# Patient Record
Sex: FEMALE | Race: WHITE | NOT HISPANIC OR LATINO | URBAN - METROPOLITAN AREA
[De-identification: names, ages, dates, MRNs, and addresses within clinical notes are randomized per-mention and may not be internally consistent; named-entity substitution may affect disease eponyms.]

---

## 2017-11-02 ENCOUNTER — TRANSFERRED RECORDS (OUTPATIENT)
Dept: HEALTH INFORMATION MANAGEMENT | Facility: CLINIC | Age: 22
End: 2017-11-02

## 2017-11-03 ENCOUNTER — MEDICAL CORRESPONDENCE (OUTPATIENT)
Dept: HEALTH INFORMATION MANAGEMENT | Facility: CLINIC | Age: 22
End: 2017-11-03

## 2017-12-13 ENCOUNTER — OFFICE VISIT (OUTPATIENT)
Dept: OPHTHALMOLOGY | Facility: CLINIC | Age: 22
End: 2017-12-13
Payer: COMMERCIAL

## 2017-12-13 DIAGNOSIS — H52.13 MYOPIA OF BOTH EYES: ICD-10-CM

## 2017-12-13 DIAGNOSIS — H53.10 SUBJECTIVE VISUAL DISTURBANCE OF BOTH EYES: ICD-10-CM

## 2017-12-13 DIAGNOSIS — S06.9X0S TRAUMATIC BRAIN INJURY WITHOUT LOSS OF CONSCIOUSNESS, SEQUELA (H): Primary | ICD-10-CM

## 2017-12-13 ASSESSMENT — EXTERNAL EXAM - LEFT EYE: OS_EXAM: NORMAL

## 2017-12-13 ASSESSMENT — VISUAL ACUITY
OS_CC: 20/20
OS_CC: J1+
CORRECTION_TYPE: CONTACTS
OD_CC+: -1
OD_CC: J1+
METHOD: SNELLEN - LINEAR
OD_CC: 20/20
OS_CC+: -1

## 2017-12-13 ASSESSMENT — REFRACTION_MANIFEST
OD_CYLINDER: SPHERE
OS_SPHERE: -6.25
OD_SPHERE: -5.75
OS_CYLINDER: SPHERE

## 2017-12-13 ASSESSMENT — TONOMETRY
OD_IOP_MMHG: 21
IOP_METHOD: APPLANATION
OS_IOP_MMHG: 21

## 2017-12-13 ASSESSMENT — EXTERNAL EXAM - RIGHT EYE: OD_EXAM: NORMAL

## 2017-12-13 ASSESSMENT — SLIT LAMP EXAM - LIDS
COMMENTS: NORMAL
COMMENTS: NORMAL

## 2017-12-13 ASSESSMENT — CONF VISUAL FIELD
OD_NORMAL: 1
OS_NORMAL: 1

## 2017-12-13 ASSESSMENT — CUP TO DISC RATIO
OD_RATIO: 0.4
OS_RATIO: 0.4

## 2017-12-13 NOTE — PROGRESS NOTES
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 Given drastic improvement in symptoms with time, it is questionable how much benefit patient would receive from occupational therapy. Suspect that patient's symptoms should continue to gradually improve. Recommended tinted lenses to reduce glare from stripes or other high contrast patterns. Patient should RTC with any worsening of symptoms.     (H53.10) Subjective visual disturbance of both eyes  Comment: After-images since concussion  Plan: See #1. Light grey or transitions tint may help with patient's symptoms. If patient does not report any improvement, she should RTC for additional tint eval.     (H52.13) Myopia of both eyes  Plan: Refraction        Discussed findings with patient. Slight change in Rx compared with habitual lenses may help with clarity of patient's distance vision.       Complete documentation of historical and exam elements from today's encounter can  be found in the full encounter summary report (not reduplicated in this progress  note). I personally obtained the chief complaint(s) and history of present illness. I  confirmed and edited as necessary the review of systems, past medical/surgical  history, family history, social history, and examination findings as documented by  others; and I examined the patient myself. I personally reviewed the relevant tests,  images, and reports as documented above. I formulated and edited as necessary the  assessment and plan and discussed the findings and management plan with the  patient and family.    Gaston Garcia OD

## 2017-12-13 NOTE — MR AVS SNAPSHOT
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      Who to contact     Please call your clinic at [phone] to:    Ask questions about your health    Make or cancel appointments    Discuss your medicines    Learn about your test results    Speak to your doctor   If you have compliments or concerns about an experience at your clinic, or if you wish to file a complaint, please contact [facility] Patient Relations at [phone] or email us at [email or web address]         Additional Information About Your Visit        MyChart Information     eReceiptst is an electronic gateway that provides easy, online access to your medical records. With RABT, you can request a clinic appointment, read your test results, renew a prescription or communicate with your care team.     To sign up for eReceiptst visit the website at [email or web address]   You will be asked to enter the access code listed below, as well as some personal information. Please follow the directions to create your username and password.     Your access code is: [email or web address]  Expires: 2018  6:30 AM     Your access code will  in 90 days. If you need help or a new code, please contact your [facility] or call [phone] for assistance.        Care EveryWhere ID     This is your Care EveryWhere ID. This could be used by other organizations to access your  [facility] medical records  [id number]         Blood Pressure from Last 3 Encounters:   No data found for BP    Weight from Last 3 Encounters:   No data found for Wt              We Performed the Following     Refraction        Primary Care Provider    None Specified       No primary provider on file.        Equal Access to Services     SOFÍA OG : Hadjacinto aad ku hadryan Meeks, warayneda luqadaha, qaybta kaalmada adebalta, leonard gonzalez ucheabdulaziz singh ira nam. So [facility] [place] [phone].    ATENCIÓN: Si habla español, tiene a smith disposición servicios gratuitos de asistencia lingüística. Llame al [phone].    We comply with applicable federal civil rights laws and [place] laws. We do not discriminate on the basis of race, color, national origin, age, disability, sex, sexual orientation, or gender identity.            Thank you!     Thank you for choosing [facility]  for your care. Our goal is always to provide you with excellent care. Hearing back from our patients is one way we can continue to improve our services. Please take a few minutes to complete the written survey that you may receive in the mail after your visit with us. Thank you!             Your Updated Medication List - Protect others around you: Learn how to safely use, store and throw away your medicines at www.disposemymeds.org.      Notice  As of 12/13/2017  4:19 PM    You have not been prescribed any medications.

## 2018-07-11 ENCOUNTER — TRANSFERRED RECORDS (OUTPATIENT)
Dept: HEALTH INFORMATION MANAGEMENT | Facility: CLINIC | Age: 23
End: 2018-07-11

## 2018-08-22 ENCOUNTER — OFFICE VISIT (OUTPATIENT)
Dept: OPHTHALMOLOGY | Facility: CLINIC | Age: 23
End: 2018-08-22
Attending: OPHTHALMOLOGY
Payer: COMMERCIAL

## 2018-08-22 DIAGNOSIS — H40.053 BILATERAL OCULAR HYPERTENSION: Primary | ICD-10-CM

## 2018-08-22 DIAGNOSIS — H52.13 MYOPIA OF BOTH EYES: ICD-10-CM

## 2018-08-22 PROCEDURE — 92083 EXTENDED VISUAL FIELD XM: CPT | Mod: ZF | Performed by: OPHTHALMOLOGY

## 2018-08-22 PROCEDURE — 92133 CPTRZD OPH DX IMG PST SGM ON: CPT | Mod: ZF | Performed by: OPHTHALMOLOGY

## 2018-08-22 PROCEDURE — G0463 HOSPITAL OUTPT CLINIC VISIT: HCPCS | Mod: ZF

## 2018-08-22 ASSESSMENT — VISUAL ACUITY
METHOD: SNELLEN - LINEAR
OS_CC: 20/20
OD_CC: 20/20
OD_CC+: -2
CORRECTION_TYPE: GLASSES

## 2018-08-22 ASSESSMENT — REFRACTION_WEARINGRX
OD_SPHERE: -5.75
OS_SPHERE: -6.25
OD_CYLINDER: SPHERE
OS_CYLINDER: SPHERE

## 2018-08-22 ASSESSMENT — TONOMETRY
OD_IOP_MMHG: 20
OS_IOP_MMHG: 20
IOP_METHOD: APPLANATION

## 2018-08-22 ASSESSMENT — PACHYMETRY
OD_CT(UM): 582
OS_CT(UM): 579

## 2018-08-22 ASSESSMENT — EXTERNAL EXAM - LEFT EYE: OS_EXAM: NORMAL

## 2018-08-22 ASSESSMENT — SLIT LAMP EXAM - LIDS
COMMENTS: NORMAL
COMMENTS: NORMAL

## 2018-08-22 ASSESSMENT — CUP TO DISC RATIO
OS_RATIO: 0.4
OD_RATIO: 0.4

## 2018-08-22 ASSESSMENT — EXTERNAL EXAM - RIGHT EYE: OD_EXAM: NORMAL

## 2018-08-22 ASSESSMENT — CONF VISUAL FIELD
OD_NORMAL: 1
OS_NORMAL: 1

## 2018-08-22 NOTE — NURSING NOTE
Chief Complaints and History of Present Illnesses   Patient presents with     Consult For     Glaucoma evaluation      HPI    Affected eye(s):  Both   Symptoms:        Duration:  1 year   Frequency:  Constant       Do you have eye pain now?:  No      Comments:  Pt. States that she was seen at an Optometry clinic and was told her IOP was increased the last 4 visits.  Does have family history of glaucoma in grandmother.   No c/o [facility].  No c/o comfort BE.  Raquel Rogers COT 8:05 AM August 22, 2018

## 2018-08-22 NOTE — PROGRESS NOTES
HPI  Irais Esquivel is a 23 year old female here for evaluation of ocular hypertension. She has been told the last few times when she went to [facility] that her IOP was elevated. She thinks the numbers were in the mid-20s up to about 26. Her vision has been stable. No eye pain, redness, discharge. Occasional floaters, no flashes.    POH: Myopia, no history of eye surgery or trauma  PMH: None  FH: Paternal GM with glaucoma on drops  SH: Non-smoker    Assessment & Plan      (H40.053) Bilateral ocular hypertension  (primary encounter diagnosis)  Comment: Normal-range IOPs today (20mmHg right eye and 20mmHg left eye) with moderately thick pachy. Healthy ONH appearance. Visual field full and reassuring RNFL thickness on nerve OCT.  Plan: Continue to observe. Could consider drop treatment if IOP elevates into high 20's or 30's.     (H52.13) Myopia of both eyes  Comment: Good vision with current glasses  Plan: Observe     -----------------------------------------------------------------------------------    Patient disposition:   Return in about 1 year (around 8/22/2019) for applanation IOP check, dilation. or sooner as needed.    Teaching statement:  Complete documentation of historical and exam elements from today's encounter can be found in the full encounter summary report (not reduplicated in this progress note). I personally obtained the chief complaint(s) and history of present illness.  I confirmed and edited as necessary the review of systems, past medical/surgical history, family history, social history, and examination findings as documented by others; and I examined the patient myself. I personally reviewed the relevant tests, images, and reports as documented above.     I formulated and edited as necessary the assessment and plan and discussed the findings and management plan with the patient and family.    Yessy Martinez MD  Comprehensive Ophthalmology & Ocular Pathology  Department of Ophthalmology  and Visual Neurosciences  [email or web address]  Pager [phone]

## 2018-08-22 NOTE — MR AVS SNAPSHOT
After Visit Summary   2018    Irais Esquivel    MRN: [id number]           Patient Information     Date Of Birth          1995        Visit Information        Provider Department      2018 8:00 AM Yessy [facility]        Today's Diagnoses     Bilateral ocular hypertension    -  1    Myopia of both eyes           Follow-ups after your visit        Follow-up notes from your care team     Return in about 1 year (around 2019) for applanation IOP check, dilation.      Who to contact     Please call your clinic at [phone] to:    Ask questions about your health    Make or cancel appointments    Discuss your medicines    Learn about your test results    Speak to your doctor            Additional Information About Your Visit        MyChart Information     Integrated Medical Managementhart is an electronic gateway that provides easy, online access to your medical records. With ALCOHOOT, you can request a clinic appointment, read your test results, renew a prescription or communicate with your care team.     To sign up for HelloBookst visit the website at [email or web address]   You will be asked to enter the access code listed below, as well as some personal information. Please follow the directions to create your username and password.     Your access code is: [email or web address]  Expires: 2018  6:30 AM     Your access code will  in 90 days. If you need help or a new code, please contact your [facility] or call [phone] for assistance.        Care EveryWhere ID     This is your Care EveryWhere ID. This could be used by other organizations to access your [place] medical records  [id number]         Blood Pressure from Last 3 Encounters:   No data found for BP    Weight from Last 3 Encounters:   No data found for Wt              We Performed the Following     OCT Optic Nerve RNFL Spectralis OU (both eyes)     OVF 24-2 Dynamic OU        Primary Care Provider     None Specified       No primary provider on file.        Equal Access to Services     SOFÍA OG : Marciano Meeks, ilia calderon, leonard cruz. So [facility] [phone].    ATENCIÓN: Si habla español, tiene a smith disposición servicios gratuitos de asistencia lingüística. Llame al [phone].    We comply with applicable federal civil rights laws and [place] laws. We do not discriminate on the basis of race, color, national origin, age, disability, sex, sexual orientation, or gender identity.            Thank you!     Thank you for choosing E[facility] CLINIC  for your care. Our goal is always to provide you with excellent care. Hearing back from our patients is one way we can continue to improve our services. Please take a few minutes to complete the written survey that you may receive in the mail after your visit with us. Thank you!             Your Updated Medication List - Protect others around you: Learn how to safely use, store and throw away your medicines at www.disposemymeds.org.      Notice  As of 8/22/2018  9:56 AM    You have not been prescribed any medications.

## 2018-08-23 ENCOUNTER — MEDICAL CORRESPONDENCE (OUTPATIENT)
Dept: HEALTH INFORMATION MANAGEMENT | Facility: CLINIC | Age: 23
End: 2018-08-23

## 2018-08-24 DIAGNOSIS — Z87.828 PERSONAL HISTORY OF INJURY: ICD-10-CM

## 2018-08-24 DIAGNOSIS — H53.9 VISION CHANGES: ICD-10-CM

## 2018-08-24 DIAGNOSIS — R25.3 TWITCHING: Primary | ICD-10-CM

## 2018-08-24 DIAGNOSIS — R42 LIGHTHEADEDNESS: ICD-10-CM

## 2018-08-24 LAB
ALBUMIN SERPL-MCNC: 4 G/DL (ref 3.4–5)
ALP SERPL-CCNC: 74 U/L (ref 40–150)
ALT SERPL W P-5'-P-CCNC: 32 U/L (ref 0–50)
ANION GAP SERPL CALCULATED.3IONS-SCNC: 8 MMOL/L (ref 3–14)
AST SERPL W P-5'-P-CCNC: 22 U/L (ref 0–45)
BILIRUB SERPL-MCNC: 0.4 MG/DL (ref 0.2–1.3)
BUN SERPL-MCNC: 10 MG/DL (ref 7–30)
CALCIUM SERPL-MCNC: 9.1 MG/DL (ref 8.5–10.1)
CHLORIDE SERPL-SCNC: 102 MMOL/L (ref 94–109)
CK SERPL-CCNC: 58 U/L (ref 30–225)
CO2 SERPL-SCNC: 25 MMOL/L (ref 20–32)
CREAT SERPL-MCNC: 0.71 MG/DL (ref 0.52–1.04)
ERYTHROCYTE [SEDIMENTATION RATE] IN BLOOD BY WESTERGREN METHOD: 8 MM/H (ref 0–20)
GFR SERPL CREATININE-BSD FRML MDRD: >90 ML/MIN/1.7M2
GLUCOSE SERPL-MCNC: 82 MG/DL (ref 70–99)
HBA1C MFR BLD: 5.3 % (ref 0–5.6)
POTASSIUM SERPL-SCNC: 4 MMOL/L (ref 3.4–5.3)
PROT SERPL-MCNC: 7.9 G/DL (ref 6.8–8.8)
SODIUM SERPL-SCNC: 135 MMOL/L (ref 133–144)
T4 FREE SERPL-MCNC: 0.96 NG/DL (ref 0.76–1.46)
TSH SERPL DL<=0.005 MIU/L-ACNC: 1.26 MU/L (ref 0.4–4)
VIT B12 SERPL-MCNC: 343 PG/ML (ref 193–986)

## 2018-08-26 LAB — DEPRECATED CALCIDIOL+CALCIFEROL SERPL-MC: 27 UG/L (ref 20–75)

## 2018-08-27 LAB
ANA SER QL IF: NEGATIVE
B BURGDOR IGG+IGM SER QL: 0.08 (ref 0–0.89)

## 2019-02-07 ENCOUNTER — NURSE TRIAGE (OUTPATIENT)
Dept: NURSING | Facility: CLINIC | Age: 24
End: 2019-02-07

## 2019-02-07 ENCOUNTER — OFFICE VISIT (OUTPATIENT)
Dept: FAMILY MEDICINE | Facility: CLINIC | Age: 24
End: 2019-02-07
Payer: COMMERCIAL

## 2019-02-07 VITALS
DIASTOLIC BLOOD PRESSURE: 89 MMHG | TEMPERATURE: 98.1 F | OXYGEN SATURATION: 97 % | WEIGHT: 205 LBS | HEART RATE: 112 BPM | SYSTOLIC BLOOD PRESSURE: 139 MMHG | RESPIRATION RATE: 16 BRPM

## 2019-02-07 DIAGNOSIS — G47.00 INSOMNIA, UNSPECIFIED TYPE: Primary | ICD-10-CM

## 2019-02-07 DIAGNOSIS — Z87.820 HISTORY OF CONCUSSION: ICD-10-CM

## 2019-02-07 DIAGNOSIS — R25.1 SHAKINESS: ICD-10-CM

## 2019-02-07 ASSESSMENT — ANXIETY QUESTIONNAIRES
GAD7 TOTAL SCORE: 11
5. BEING SO RESTLESS THAT IT IS HARD TO SIT STILL: SEVERAL DAYS
3. WORRYING TOO MUCH ABOUT DIFFERENT THINGS: SEVERAL DAYS
6. BECOMING EASILY ANNOYED OR IRRITABLE: NOT AT ALL
IF YOU CHECKED OFF ANY PROBLEMS ON THIS QUESTIONNAIRE, HOW DIFFICULT HAVE THESE PROBLEMS MADE IT FOR YOU TO DO YOUR WORK, TAKE CARE OF THINGS AT HOME, OR GET ALONG WITH OTHER PEOPLE: SOMEWHAT DIFFICULT
7. FEELING AFRAID AS IF SOMETHING AWFUL MIGHT HAPPEN: NEARLY EVERY DAY
2. NOT BEING ABLE TO STOP OR CONTROL WORRYING: NEARLY EVERY DAY
1. FEELING NERVOUS, ANXIOUS, OR ON EDGE: MORE THAN HALF THE DAYS

## 2019-02-07 ASSESSMENT — PAIN SCALES - GENERAL: PAINLEVEL: NO PAIN (0)

## 2019-02-07 ASSESSMENT — PATIENT HEALTH QUESTIONNAIRE - PHQ9
5. POOR APPETITE OR OVEREATING: SEVERAL DAYS
SUM OF ALL RESPONSES TO PHQ QUESTIONS 1-9: 4

## 2019-02-07 NOTE — PROGRESS NOTES
"  SUBJECTIVE:   Irais Esquivel is a 23 year old female who presents to clinic today for the following health issues: Shakiness    Patient with PMH for post-concussive syndrome and bilateral ocular HTN.  Patient is followed by neurology and ophthalmology.    Irais presents to the clinic with concern with neck and arm shakiness since Saturday and shortness of breath this morning. She states that she feels the shaking \"starting in her neck on the inside\" without visible external shaking. She has a history of concussion 2 years ago and has been followed by neurology, and states that she received the diagnosis of post-concussion syndrome with occasional episodes of vertigo. She hit her head while participating in synchronized ice skating. She states she has some episodes of vertigo and numbness/tingling in her bilateral hands. Today, she denies vertigo or dizziness, muscle weakness, neck tightness or pain, or upper or lower extremity numbness or tingling. She declines confusion. She denies headaches or vision changes. She denies shortness of breath in the clinic. She decline chest pain. She also states that she has periods of insomnia where she is unable to sleep for many days in a row. She states she goes to bed and sometimes is able to fall asleep immediately.  Some nights she stays up most of the night because a song or other information becomes stuck in her head. She tries to practice good sleep hygiene.  Does not drink caffeine. 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 PHQ-9-4.  She eats regular meals, exercises 4 days per week, and avoids alcohol, caffeine, tobacco, and other drugs.   Declines arm or leg weakness.   Declines chest pain.     Patient had lab work August 2018 which was WNL and is as follows -   Last Comprehensive Metabolic Panel:  Sodium   Date Value Ref Range Status   08/24/2018 135 133 - 144 mmol/L " Final     Potassium   Date Value Ref Range Status   08/24/2018 4.0 3.4 - 5.3 mmol/L Final     Chloride   Date Value Ref Range Status   08/24/2018 102 94 - 109 mmol/L Final     Carbon Dioxide   Date Value Ref Range Status   08/24/2018 25 20 - 32 mmol/L Final     Anion Gap   Date Value Ref Range Status   08/24/2018 8 3 - 14 mmol/L Final     Glucose   Date Value Ref Range Status   08/24/2018 82 70 - 99 mg/dL Final     Urea Nitrogen   Date Value Ref Range Status   08/24/2018 10 7 - 30 mg/dL Final     Creatinine   Date Value Ref Range Status   08/24/2018 0.71 0.52 - 1.04 mg/dL Final     GFR Estimate   Date Value Ref Range Status   08/24/2018 >90 >60 mL/min/1.7m2 Final     Comment:     Non  GFR Calc     Calcium   Date Value Ref Range Status   08/24/2018 9.1 8.5 - 10.1 mg/dL Final     TSH   Date Value Ref Range Status   08/24/2018 1.26 0.40 - 4.00 mU/L Final         Problem list and histories reviewed & adjusted, as indicated.  Additional history: as documented    There is no problem list on file for this patient.    Past Surgical History:   Procedure Laterality Date     wisdom teeth         Social History     Tobacco Use     Smoking status: Never Smoker     Smokeless tobacco: Never Used   Substance Use Topics     Alcohol use: Not on file     Family History   Problem Relation Age of Onset     Glaucoma Paternal Grandmother      Macular Degeneration No family hx of      Retinal detachment No family hx of            Reviewed and updated as needed this visit by clinical staff  Tobacco  Allergies  Meds       Reviewed and updated as needed this visit by Provider         ROS:  CONSTITUTIONAL:NEGATIVE for fever, chills, change in weight  EYES: NEGATIVE for vision changes or irritation. POSITIVE for occular HTN - see HPI.   RESP:NEGATIVE for significant cough.  POSITIVE for SOB this morning, but her SOB has resolved - see HPI.  CV: NEGATIVE for chest pain, palpitations or peripheral edema  MUSCULOSKELETAL:  NEGATIVE for significant arthralgias or myalgia.     NEURO: NEGATIVE for weakness, dizziness or paresthesias  PSYCHIATRIC: Feeling emotional today due to lack of sleep     OBJECTIVE:     /89   Pulse 112   Temp 98.1  F (36.7  C) (Oral)   Resp 16   Wt 93 kg (205 lb)   SpO2 97%   There is no height or weight on file to calculate BMI.  GENERAL: healthy, alert and no distress  EYES: Eyes grossly normal to inspection, PERRL and conjunctivae and sclerae normal  NECK: no adenopathy, no asymmetry, masses, or scars and thyroid normal to palpation  RESP: lungs clear to auscultation - no rales, rhonchi or wheezes  CV: regular rate and rhythm, normal S1 S2, no S3 or S4, no murmur, click or rub, no peripheral edema and peripheral pulses strong  MS: no gross musculoskeletal defects noted, no edema  NEURO: Normal strength and tone, mentation intact and speech normal  PSYCH: mentation appears normal, teary at times    Diagnostic Test Results:  none     ASSESSMENT/PLAN:   (G47.00) Insomnia, unspecified type  (primary encounter diagnosis)  Comment:   Plan: Provided patient education regarding sleep hygiene and non-pharmacological management of insomnia. Discussed sleep hygiene measures along with stress reduction techniques - biofeedback, meditation, accupuncture. Discussed starting a medication such as a low-dose trazodone. Patient would like to try non-pharmacological measures.        (Z87.820) History of concussion  Comment:  Patient would like a second opinion.    Plan: NEUROLOGY ADULT REFERRAL            (R25.1) Shakiness  Comment:  Discussed lab work - TSH and CMP, patient had normal lab work in August.  Patient would like to hold off at this time.  Plan: Shakiness occurs in/around neck and is not visible to others. Discussed possibility of neck spasms.  Neck exercises given to patient for a trial.       See Patient Instructions  -Discussed healthy lifestyle modifications including regular exercise, healthy diet,  stress management, and avoidance of caffeine and alcohol    iLla Bergman, FNP Student FRANCISCA    I was present with the NPP student who participated in the service and in the documentation of the services provided. I have verified the history and personally performed the physical exam and medical decision making, as documented by the student and edited by me    AMANDA Marquez CNP  02/07/19  11:50 AM    Follow-up in 1 to 2 weeks, sooner with any new or worsening symptoms.    Discussed signs and symptoms requiring urgent evaluation.   Return to clinic if no improvement or symptoms worsen.  Patient verbalized understanding & agreed with plan of care.    Approximately 40 minutes was spent with patient, more than 50% of this was spent discussing patient symptoms and plan of care.         AMANDA Marquez, CNP   [facility]

## 2019-02-07 NOTE — TELEPHONE ENCOUNTER
"For the past couple of days Irais has felt shaky and this morning short of breath and nauseated.  Today Irais feels dizzy.  Denies diabetic.  Does not have a thermometer.      Reason for Disposition    [1] MODERATE dizziness (e.g., interferes with normal activities) AND [2] has NOT been evaluated by physician for this  (Exception: dizziness caused by heat exposure, sudden standing, or poor fluid intake)    Additional Information    Negative: Severe difficulty breathing (e.g., struggling for each breath, speaks in single words)    Negative: [1] Difficulty breathing or swallowing AND [2] started suddenly after medicine, an allergic food or bee sting    Negative: Shock suspected (e.g., cold/pale/clammy skin, too weak to stand, low BP, rapid pulse)    Negative: Difficult to awaken or acting confused  (e.g., disoriented, slurred speech)    Negative: [1] Weakness (i.e., paralysis, loss of muscle strength) of the face, arm or leg on one side of the body AND [2] sudden onset AND [3] present now    Negative: [1] Numbness (i.e., loss of sensation) of the face, arm or leg on one side of the body AND [2] sudden onset AND [3] present now    Negative: [1] Loss of speech or garbled speech AND [2] sudden onset AND [3] present now    Negative: Overdose (accidental or intentional) of medications    Negative: [1] Fainted > 15 minutes ago AND [2] still feels too weak or dizzy to stand    Negative: Heart beating < 50 beats per minute OR > 140 beats per minute    Negative: Sounds like a life-threatening emergency to the triager    Negative: Difficulty breathing    Negative: SEVERE dizziness (e.g., unable to stand, requires support to walk, feels like passing out now)    Negative: Extra heart beats OR irregular heart beating  (i.e., \"palpitations\")    Negative: [1] Drinking very little AND [2] dehydration suspected (e.g., no urine > 12 hours, very dry mouth, very lightheaded)    Negative: Patient sounds very sick or weak to the triager    " Negative: [1] Dizziness caused by heat exposure, sudden standing, or poor fluid intake AND [2] no improvement after 2 hours of rest and fluids    Negative: [1] Fever > 103 F (39.4 C) AND [2] not able to get the fever down using Fever Care Advice    Negative: [1] Fever > 101 F (38.3 C) AND [2] age > 60    Negative: [1] Fever > 101 F (38.3 C) AND [2] bedridden (e.g., nursing home patient, CVA, chronic illness, recovering from surgery)    Negative: [1] Fever > 100.5 F (38.1 C) AND [2] diabetes mellitus or weak immune system (e.g., HIV positive, cancer chemo, splenectomy, organ transplant, chronic steroids)    Protocols used: DIZZINESS - LIGHTHEADEDNESS-ADULT-

## 2019-02-07 NOTE — PATIENT INSTRUCTIONS
[facility] Medication Refill Request Information:  * Please contact your pharmacy regarding ANY request for medication refills.  ** [facility] Clinic Prescription Fax = [phone]  * Please allow 3 business days for routine medication refills.  * Please allow 5 business days for controlled substance medication refills.     [facility] Test Result notification information:  *You will be notified with in 7-10 days of your appointment day regarding the results of your test.  If you are on MyChart you will be notified as soon as the provider has reviewed the results and signed off on them.    [facility]: [phone]                   Neck Spasm Rehabilitation Exercises   You may do all of these exercises right away but avoid any movements that increase your pain.     Neck rotation with flexion:   Right: Turn your head to the right and clasp your hands behind your head. Let the weight of your arms pull your chin to the right side of your chest. Relax. Hold for a count of 15. Do this 3 times.   Left: Turn your head to the left and clasp your hands behind your head. Let the weight of your arms pull your chin to the left side of your chest. Relax. Hold for a count of 15. Do this 3 times.     Chin tuck: Place your fingertips on your chin and gently push your head straight back as if you are trying to make a double chin. Keep looking forward as your head moves back. Hold 5 seconds and repeat 5 times.     Scalene stretch: This stretches the neck muscles that attach to your ribs. Sitting in an upright position, clasp both hands behind your back, lower your left shoulder, and tilt your head toward the right. Hold this position for 15 to 30 seconds and then come back to the starting position. Lower your right shoulder and tilt your head toward the left until you feel a stretch. Hold for 15 to 30 seconds. Repeat 3 times on each side.     Neck rotation stretch   Right side: Rotate your  neck by looking over your right shoulder. Lift your right hand and place your palm on the left side of your chin. Push your chin with your palm toward your right shoulder. Hold for a count of 10. Do this 3 times.   Left side: Rotate your neck by looking over your left shoulder. Lift your left hand and place your palm on the right side of your chin. Push your chin with your palm toward your left shoulder. Hold for a count of 10. Do this 3 times.     Scapular squeeze: While sitting or standing with your arms by your sides, squeeze your shoulder blades together and hold for 5 seconds. Do 3 sets of 10.     Thoracic extension: While sitting in a chair, clasp both arms behind your head. Gently arch backward and look up toward the ceiling. Repeat 10 times. Do this several times per day.

## 2019-02-07 NOTE — NURSING NOTE
Chief Complaint   Patient presents with     Dizziness     Pt complains of being dizzy and having shakes since Saturday morning.         Robin Stark, EMT on 2/7/2019 at 9:27 AM

## 2019-02-08 ASSESSMENT — ANXIETY QUESTIONNAIRES: GAD7 TOTAL SCORE: 11

## 2019-02-15 ENCOUNTER — HEALTH MAINTENANCE LETTER (OUTPATIENT)
Age: 24
End: 2019-02-15

## 2019-02-19 ENCOUNTER — TELEPHONE (OUTPATIENT)
Dept: FAMILY MEDICINE | Facility: CLINIC | Age: 24
End: 2019-02-19

## 2019-02-19 NOTE — TELEPHONE ENCOUNTER
Called patient to follow-up regarding office visit on 2/7/2019.  She states she is feeling slightly better.  She declines any further questions or concerns.      HAMMAD

## 2020-03-11 ENCOUNTER — HEALTH MAINTENANCE LETTER (OUTPATIENT)
Age: 25
End: 2020-03-11

## 2020-10-06 ENCOUNTER — OFFICE VISIT (OUTPATIENT)
Dept: OCCUPATIONAL MEDICINE | Age: 25
End: 2020-10-06

## 2020-10-06 DIAGNOSIS — Z11.1 SCREENING FOR TUBERCULOSIS: Primary | ICD-10-CM

## 2020-10-06 PROCEDURE — 86580 TB INTRADERMAL TEST: CPT | Performed by: PREVENTIVE MEDICINE

## 2020-10-08 ENCOUNTER — OFFICE VISIT (OUTPATIENT)
Dept: OCCUPATIONAL MEDICINE | Age: 25
End: 2020-10-08

## 2020-10-08 DIAGNOSIS — Z11.1 SCREENING FOR TUBERCULOSIS: Primary | ICD-10-CM

## 2020-10-08 LAB
INDURATION: 0 MM (ref 0–?)
SKIN TEST RESULT: NEGATIVE

## 2020-12-27 ENCOUNTER — HEALTH MAINTENANCE LETTER (OUTPATIENT)
Age: 25
End: 2020-12-27

## 2021-04-25 ENCOUNTER — HEALTH MAINTENANCE LETTER (OUTPATIENT)
Age: 26
End: 2021-04-25

## 2021-10-09 ENCOUNTER — HEALTH MAINTENANCE LETTER (OUTPATIENT)
Age: 26
End: 2021-10-09

## 2022-05-21 ENCOUNTER — HEALTH MAINTENANCE LETTER (OUTPATIENT)
Age: 27
End: 2022-05-21

## 2022-09-17 ENCOUNTER — HEALTH MAINTENANCE LETTER (OUTPATIENT)
Age: 27
End: 2022-09-17

## 2023-06-04 ENCOUNTER — HEALTH MAINTENANCE LETTER (OUTPATIENT)
Age: 28
End: 2023-06-04